# Patient Record
Sex: FEMALE
[De-identification: names, ages, dates, MRNs, and addresses within clinical notes are randomized per-mention and may not be internally consistent; named-entity substitution may affect disease eponyms.]

---

## 2020-03-04 PROBLEM — Z00.00 ENCOUNTER FOR PREVENTIVE HEALTH EXAMINATION: Status: ACTIVE | Noted: 2020-03-04

## 2020-03-05 ENCOUNTER — APPOINTMENT (OUTPATIENT)
Dept: OTOLARYNGOLOGY | Facility: CLINIC | Age: 60
End: 2020-03-05
Payer: COMMERCIAL

## 2020-03-05 VITALS — DIASTOLIC BLOOD PRESSURE: 96 MMHG | SYSTOLIC BLOOD PRESSURE: 135 MMHG | HEART RATE: 70 BPM

## 2020-03-05 DIAGNOSIS — H93.293 OTHER ABNORMAL AUDITORY PERCEPTIONS, BILATERAL: ICD-10-CM

## 2020-03-05 DIAGNOSIS — Z86.79 PERSONAL HISTORY OF OTHER DISEASES OF THE CIRCULATORY SYSTEM: ICD-10-CM

## 2020-03-05 DIAGNOSIS — H93.12 TINNITUS, LEFT EAR: ICD-10-CM

## 2020-03-05 DIAGNOSIS — R42 DIZZINESS AND GIDDINESS: ICD-10-CM

## 2020-03-05 DIAGNOSIS — Z83.3 FAMILY HISTORY OF DIABETES MELLITUS: ICD-10-CM

## 2020-03-05 DIAGNOSIS — Z78.9 OTHER SPECIFIED HEALTH STATUS: ICD-10-CM

## 2020-03-05 PROCEDURE — 99203 OFFICE O/P NEW LOW 30 MIN: CPT

## 2020-03-05 PROCEDURE — 92557 COMPREHENSIVE HEARING TEST: CPT

## 2020-03-05 PROCEDURE — 92567 TYMPANOMETRY: CPT

## 2020-03-06 PROBLEM — Z83.3 FAMILY HISTORY OF DIABETES MELLITUS: Status: ACTIVE | Noted: 2020-03-05

## 2020-03-06 PROBLEM — R42 DIZZINESS AND GIDDINESS: Status: ACTIVE | Noted: 2020-03-06

## 2020-03-06 PROBLEM — Z78.9 CAFFEINE USE: Status: ACTIVE | Noted: 2020-03-05

## 2020-03-06 PROBLEM — H93.293 ABNORMAL AUDITORY PERCEPTION OF BOTH EARS: Status: ACTIVE | Noted: 2020-03-06

## 2020-03-06 PROBLEM — H93.12 TINNITUS OF LEFT EAR: Status: ACTIVE | Noted: 2020-03-06

## 2020-03-06 PROBLEM — Z86.79 HISTORY OF HYPERTENSION: Status: RESOLVED | Noted: 2020-03-05 | Resolved: 2020-03-06

## 2020-03-06 RX ORDER — LOSARTAN POTASSIUM 100 MG/1
TABLET, FILM COATED ORAL
Refills: 0 | Status: ACTIVE | COMMUNITY

## 2020-03-06 RX ORDER — HYDROCHLOROTHIAZIDE 12.5 MG/1
TABLET ORAL
Refills: 0 | Status: ACTIVE | COMMUNITY

## 2020-03-06 NOTE — CONSULT LETTER
[Dear  ___] : Dear  [unfilled], [Consult Letter:] : I had the pleasure of evaluating your patient, [unfilled]. [Please see my note below.] : Please see my note below. [Consult Closing:] : Thank you very much for allowing me to participate in the care of this patient.  If you have any questions, please do not hesitate to contact me. [Sincerely,] : Sincerely, [FreeTextEntry3] : Lucita Barillas MD\par

## 2020-03-06 NOTE — HISTORY OF PRESENT ILLNESS
[de-identified] : SYLVESTER RAMOS is a 59 year patient Here for left-sided tinnitus. It started in January. It bothered her mostly at night. It was not pulsatile. She said it improved about 2 days ago. She denies otalgia, otorrhea, or ear pressure. She was not sick when it started. She does have a history of intermittent dizziness and vertigo. She has lightheadedness and dizziness when she stands up. She also experienced dizziness when working out. She denies headaches, chest pain, shortness of breath, and palpitations, She has an appointment to see her doctor. She did have recurrent ear infections as a child but has no history of prior otologic surgery, ear/head trauma, noise exposure, or family history of hearing loss. She said that she did have a cardiology evaluation last  year. She has no nasal or throat symptoms. The dizziness has also improved.

## 2020-03-06 NOTE — ASSESSMENT
[FreeTextEntry1] : She had left-sided tinnitus which resolved about 2 days ago. Her ear exam was normal. Audiogram was essentially normal with a slight sensorineural hearing loss in the left ear at 8000 Hz. However, the difference between the 2 ears was 10 dB or less so it may not be significant. She also had dizziness. We discussed possible etiologies. She may have orthostatic hypotension.  Also, she had dizziness when exercising.  I discussed the possibility of a cardiac source as well as peripheral vestibulopathy.  She said she had a cardiac work up last year\par \par PLAN\par \par - findings and management options discussed with the patient. \par - Good aural hygiene.\par - noise precautions\par - repeat audiogram in one year\par - literature regarding tinnitus given\par - Avoid substances that can make tinnitus worse.  \par - She may consider ABR, VNG, [facility] for further inner ear evaluation. She is going to think over. She is considering observation since her symptoms have resolved\par - I recommended that she see her PCP and cardiologist for evaluation to rule out other sources of the dizziness. She may also require a neurology evaluation. We also discussed obtaining possible MRI for evaluation of the tinnitus. \par - follow up in in 1-2 months. If she would like to proceed with any further testing prior to that, I asked her to call me.  \par - call and return earlier if any concerns or recurrent symptoms